# Patient Record
Sex: MALE | URBAN - METROPOLITAN AREA
[De-identification: names, ages, dates, MRNs, and addresses within clinical notes are randomized per-mention and may not be internally consistent; named-entity substitution may affect disease eponyms.]

---

## 2020-07-16 ENCOUNTER — OFFICE VISIT (OUTPATIENT)
Dept: URBAN - METROPOLITAN AREA CLINIC 62 | Facility: CLINIC | Age: 55
End: 2020-07-16

## 2020-07-16 DIAGNOSIS — H52.4 PRESBYOPIA: Primary | ICD-10-CM

## 2020-07-16 PROCEDURE — 99202 OFFICE O/P NEW SF 15 MIN: CPT | Performed by: OPHTHALMOLOGY

## 2020-07-16 PROCEDURE — 99213 OFFICE O/P EST LOW 20 MIN: CPT | Performed by: OPHTHALMOLOGY

## 2020-07-16 ASSESSMENT — VISUAL ACUITY
OS: 20/20
OD: 20/20

## 2020-07-16 ASSESSMENT — KERATOMETRY
OS: 41.13
OD: 40.38

## 2020-07-16 ASSESSMENT — INTRAOCULAR PRESSURE
OS: 11
OD: 12

## 2020-07-16 NOTE — IMPRESSION/PLAN
Impression: Presbyopia: H52.4. Plan: Discussed diagnosis in detail with patient.  Rec. readers +2.00